# Patient Record
Sex: MALE | NOT HISPANIC OR LATINO | Employment: FULL TIME | URBAN - METROPOLITAN AREA
[De-identification: names, ages, dates, MRNs, and addresses within clinical notes are randomized per-mention and may not be internally consistent; named-entity substitution may affect disease eponyms.]

---

## 2017-10-20 ENCOUNTER — OFFICE VISIT (OUTPATIENT)
Dept: OPTOMETRY | Facility: CLINIC | Age: 37
End: 2017-10-20
Payer: COMMERCIAL

## 2017-10-20 ENCOUNTER — TELEPHONE (OUTPATIENT)
Dept: OPTOMETRY | Facility: CLINIC | Age: 37
End: 2017-10-20

## 2017-10-20 DIAGNOSIS — H52.223 REGULAR ASTIGMATISM OF BOTH EYES: Primary | ICD-10-CM

## 2017-10-20 PROCEDURE — 92015 DETERMINE REFRACTIVE STATE: CPT | Performed by: OPTOMETRIST

## 2017-10-20 PROCEDURE — 92310 CONTACT LENS FITTING OU: CPT | Performed by: OPTOMETRIST

## 2017-10-20 PROCEDURE — 92004 COMPRE OPH EXAM NEW PT 1/>: CPT | Performed by: OPTOMETRIST

## 2017-10-20 ASSESSMENT — REFRACTION_CURRENTRX
OD_SPHERE: +0.25
OS_BRAND: BIOFINITY TORIC
OD_BASECURVE: 8.7
OS_BRAND: J&J ACUVUE OASYS FOR ASTIGMATISM BC 8.6, D 14.5
OD_AXIS: 110
OS_BASECURVE: 8.7
OS_SPHERE: PLANO
OS_CYLINDER: -2.25
OS_AXIS: 080
OD_BRAND: BIOFINITY TORIC
OS_CYLINDER: -2.25
OD_BRAND: J&J ACUVUE OASYS FOR ASTIGMATISM BC 8.6, D 14.5
OD_SPHERE: +0.25
OD_CYLINDER: -2.25
OS_SPHERE: +0.25
OS_AXIS: 080
OD_AXIS: 110
OD_DIAMETER: 14.5
OS_DIAMETER: 14.5
OD_CYLINDER: -2.25

## 2017-10-20 ASSESSMENT — CUP TO DISC RATIO
OD_RATIO: 0.2
OS_RATIO: 0.2

## 2017-10-20 ASSESSMENT — CONF VISUAL FIELD
OS_NORMAL: 1
OD_NORMAL: 1

## 2017-10-20 ASSESSMENT — REFRACTION_WEARINGRX
OD_SPHERE: -2.00
OD_CYLINDER: +2.25
OD_AXIS: 161
OS_AXIS: 015
OS_SPHERE: -2.00
SPECS_TYPE: SVL
OS_CYLINDER: +2.75

## 2017-10-20 ASSESSMENT — VISUAL ACUITY
OS_CC: 20/20
OS_CC: 20/20
OD_CC: 20/20
METHOD: SNELLEN - LINEAR
CORRECTION_TYPE: CONTACTS
OD_CC: 20/20
OS_CC+: -1

## 2017-10-20 ASSESSMENT — EXTERNAL EXAM - RIGHT EYE: OD_EXAM: NORMAL

## 2017-10-20 ASSESSMENT — TONOMETRY
OS_IOP_MMHG: 12
OD_IOP_MMHG: 16
IOP_METHOD: TONOPEN

## 2017-10-20 ASSESSMENT — REFRACTION_MANIFEST
OS_AXIS: 170
OD_CYLINDER: +2.50
OS_SPHERE: -2.25
OD_SPHERE: -2.00
OS_CYLINDER: +2.25
OD_AXIS: 018

## 2017-10-20 ASSESSMENT — SLIT LAMP EXAM - LIDS
COMMENTS: NORMAL
COMMENTS: NORMAL

## 2017-10-20 ASSESSMENT — EXTERNAL EXAM - LEFT EYE: OS_EXAM: NORMAL

## 2017-10-20 NOTE — MR AVS SNAPSHOT
After Visit Summary   10/20/2017    Ron Peoples    MRN: [id number]           Patient Information     Date Of Birth          1980        Visit Information        Provider Department      10/20/2017 8:30 AM Sj Diaz, OD [facility]        Today's Diagnoses     Regular astigmatism of both eyes    -  1      Care Instructions    Eyeglass prescription given.    Contact lens prescription given.  Rx for Biofinity is finalized.  Will try Acuvue Oasys trials- return for cl check if that lens is preferred.    Trial contacts will be sent to patient.    Return in 1 year for a complete eye exam or sooner if needed.    Sj Diaz OD            Follow-ups after your visit        Follow-up notes from your care team     Return in about 1 year (around 10/20/2018) for Annual Visit.      Who to contact     If you have questions or need follow up information about today's clinic visit or your schedule please contact [facility] directly at [phone].  Normal or non-critical lab and imaging results will be communicated to you by Handmarkhart, letter or phone within 4 business days after the clinic has received the results. If you do not hear from us within 7 days, please contact the clinic through Handmarkhart or phone. If you have a critical or abnormal lab result, we will notify you by phone as soon as possible.  Submit refill requests through Digital Harbor or call your pharmacy and they will forward the refill request to us. Please allow 3 business days for your refill to be completed.          Additional Information About Your Visit        Handmarkhart Information     Digital Harbor is an electronic gateway that provides easy, online access to your medical records. With Digital Harbor, you can request a clinic appointment, read your test results, renew a prescription or communicate with your care team.     To sign up for [employer] visit the website at [email or web address]   You will be asked to  enter the access code listed below, as well as some personal information. Please follow the directions to create your username and password.     Your access code is: [email or web address]  Expires: 2018 10:33 AM     Your access code will  in 90 days. If you need help or a new code, please contact your [facility] or call [phone] for assistance.        Care EveryWhere ID     This is your Care EveryWhere ID. This could be used by other organizations to access your [place] medical records  [id number]         Blood Pressure from Last 3 Encounters:   No data found for BP    Weight from Last 3 Encounters:   No data found for Wt              We Performed the Following     CONTACT LENS FITTING,BILAT     EYE EXAM (SIMPLE-NONBILLABLE)     REFRACTION        Primary Care Provider    None Specified       No primary provider on file.        Equal Access to Services     ANA MARÍA ROSALES : Jerel Henriquez, waaxda luqadaha, qaybta kaalmacarmelo negron, chanell prasad . So [facility] [phone].    ATENCIÓN: Si habla español, tiene a grimaldo disposición servicios gratuitos de asistencia lingüística. Llame al [phone].    We comply with applicable federal civil rights laws and [place] laws. We do not discriminate on the basis of race, color, national origin, age, disability, sex, sexual orientation, or gender identity.            Thank you!     Thank you for choosing [facility]  for your care. Our goal is always to provide you with excellent care. Hearing back from our patients is one way we can continue to improve our services. Please take a few minutes to complete the written survey that you may receive in the mail after your visit with us. Thank you!             Your Updated Medication List - Protect others around you: Learn how to safely use, store and throw away your medicines at www.disposemymeds.org.      Notice  As of 10/20/2017 10:33 AM     You have not been prescribed any medications.

## 2017-10-20 NOTE — PROGRESS NOTES
Chief Complaint   Patient presents with     COMPREHENSIVE EYE EXAM     Contact Lens Fitting     aware of $60 fit fee, waiver signed        Previous contact lens wearer? Yes: Biofinity toric  Comfort of contact lenses : good  Satisfied with current lenses: yes mostly but they will rotate and blur sometimes        Last Eye Exam: 02/05/2016  Dilated Previously: Yes    What are you currently using to see?  glasses and contacts, wears contacts every day and glasses just in the evening    Distance Vision Acuity: Satisfied with vision mostly but the left eye is a little blurrier than the right eye    Near Vision Acuity: Satisfied with vision while reading with glasses and contacts    Eye Comfort:  Only dry after about 12 hours of contact lens wear  Do you use eye drops? : Yes: not regularly but uses otc generic drops  Occupation or Hobbies:       Phylicia Loyola  Optometric Assistant, [employer]       Medical, surgical and family histories reviewed and updated 10/20/2017.       OBJECTIVE: See Ophthalmology exam    ASSESSMENT:    ICD-10-CM    1. Regular astigmatism of both eyes H52.223 EYE EXAM (SIMPLE-NONBILLABLE)     CONTACT LENS FITTING,BILAT     REFRACTION      PLAN:     Patient Instructions   Eyeglass prescription given.    Contact lens prescription given.  Rx for Biofinity is finalized.  Will try Acuvue Oasys trials- return for cl check if that lens is preferred.    Trial contacts will be sent to patient.    Return in 1 year for a complete eye exam or sooner if needed.    Sj Diaz, IVY

## 2017-10-20 NOTE — TELEPHONE ENCOUNTER
Brand Base Curve Diameter Sphere Cylinder Axis   Right J&J Acuvue Oasys BC 8.4, D 14.0     +0.25 -2.25 110   Left J&J Acuvue Oasys BC 8.4, D 14.0     [place] -2.25 080     Trials ordered, please mail to patient when in. Ron only needs to schedule a contact lens check appointment if he prefers the AV Oasys over the Biofinity lenses.

## 2017-10-20 NOTE — PATIENT INSTRUCTIONS
Eyeglass prescription given.    Contact lens prescription given.  Rx for Biofinity is finalized.  Will try Acuvue Oasys trials- return for cl check if that lens is preferred.    Trial contacts will be sent to patient.    Return in 1 year for a complete eye exam or sooner if needed.    Sj Diaz, OD

## 2017-11-15 ENCOUNTER — OFFICE VISIT (OUTPATIENT)
Dept: OPTOMETRY | Facility: CLINIC | Age: 37
End: 2017-11-15
Payer: COMMERCIAL

## 2017-11-15 DIAGNOSIS — H52.223 REGULAR ASTIGMATISM OF BOTH EYES: Primary | ICD-10-CM

## 2017-11-15 PROCEDURE — 92499 UNLISTED OPH SVC/PROCEDURE: CPT | Performed by: OPTOMETRIST

## 2017-11-15 PROCEDURE — 99207 ZZC NO BILLABLE SERVICE THIS VISIT: CPT | Performed by: OPTOMETRIST

## 2017-11-15 ASSESSMENT — REFRACTION_CURRENTRX
OD_SPHERE: +0.25
OD_AXIS: 110
OS_AXIS: 080
OD_CYLINDER: -2.25
OS_CYLINDER: -2.25
OD_BRAND: J&J ACUVUE OASYS FOR ASTIGMATISM BC 8.6, D 14.5
OS_SPHERE: PLANO
OS_BRAND: J&J ACUVUE OASYS FOR ASTIGMATISM BC 8.6, D 14.5

## 2017-11-15 NOTE — PROGRESS NOTES
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          Medical, surgical and family histories reviewed and updated 11/15/2017.       OBJECTIVE: See Ophthalmology exam    ASSESSMENT:    ICD-10-CM    1. Regular astigmatism of both eyes H52.223 CONTACT LENS CHECK      PLAN:    Patient Instructions   Contact lens prescription given.  Replace contact lenses every 2 weeks.    Return in 1 year for a complete eye exam or sooner if needed.    Sj Diaz, OD

## 2017-11-15 NOTE — MR AVS SNAPSHOT
After Visit Summary   11/15/2017    Ron Peoples    MRN: [id number]           Patient Information     Date Of Birth          1980        Visit Information        Provider Department      11/15/2017 2:20 PM Sj Diaz OD [facility]        Today's Diagnoses     Regular astigmatism of both eyes    -  1      Care Instructions    Contact lens prescription given.  Replace contact lenses every 2 weeks.    Return in 1 year for a complete eye exam or sooner if needed.    Sj Diaz OD    You can order your contact lenses online at [email or web address].  Click on services at the top of the page, then eye care and you will see the link to order contact lenses.  You can also order contact lenses at [phone].  Be sure to let them know which office you would like to  the lenses, [place] or [place].                  Follow-ups after your visit        Follow-up notes from your care team     Return in about 1 year (around 11/15/2018) for Annual Visit.      Your next 10 appointments already scheduled     Nov 15, 2017  2:20 PM CST   [facility] Visit with Sj Diaz OD   [facility]    [address] [place] [id number] [phone]              Who to contact     If you have questions or need follow up information about today's clinic visit or your schedule please contact [facility] directly at [phone].  Normal or non-critical lab and imaging results will be communicated to you by MyChart, letter or phone within 4 business days after the clinic has received the results. If you do not hear from us within 7 days, please contact the clinic through MyChart or phone. If you have a critical or abnormal lab result, we will notify you by phone as soon as possible.  Submit refill requests through XYZE or call your pharmacy and they will forward the refill request to us. Please allow 3 business  days for your refill to be completed.          Additional Information About Your Visit        VSS Monitoringhart Information     [employer] is an electronic gateway that provides easy, online access to your medical records. With [employer], you can request a clinic appointment, read your test results, renew a prescription or communicate with your care team.     To sign up for [employer] visit the website at [email or web address]   You will be asked to enter the access code listed below, as well as some personal information. Please follow the directions to create your username and password.     Your access code is: [email or web address]  Expires: 2018  9:33 AM     Your access code will  in 90 days. If you need help or a new code, please contact your [facility] or call [phone] for assistance.        Care EveryWhere ID     This is your Care EveryWhere ID. This could be used by other organizations to access your [place] medical records  [id number]         Blood Pressure from Last 3 Encounters:   No data found for BP    Weight from Last 3 Encounters:   No data found for Wt              We Performed the Following     CONTACT LENS CHECK        Primary Care Provider Office Phone # Fax #    [facility] [phone]       [facility] [address], [address]  [place] [address]        Equal Access to Services     ANA MARÍA ROSALES : Hadii aad ku hadasho Soomaali, waaxda luqadaha, qaybta kaalmada adeegyada, waxlauren malik. So [facility] [phone].    ATENCIÓN: Si habla español, tiene a grimaldo disposición servicios gratuitos de asistencia lingüística. Llame al [phone].    We comply with applicable federal civil rights laws and [place] laws. We do not discriminate on the basis of race, color, national origin, age, disability, sex, sexual orientation, or gender identity.            Thank you!     Thank you for choosing [facility] [place]  [facility]  for your care. Our goal is always to provide you with excellent care. Hearing back from our patients is one way we can continue to improve our services. Please take a few minutes to complete the written survey that you may receive in the mail after your visit with us. Thank you!             Your Updated Medication List - Protect others around you: Learn how to safely use, store and throw away your medicines at www.disposemymeds.org.      Notice  As of 11/15/2017  2:18 PM    You have not been prescribed any medications.

## 2017-11-15 NOTE — PATIENT INSTRUCTIONS
Contact lens prescription given.  Replace contact lenses every 2 weeks.    Return in 1 year for a complete eye exam or sooner if needed.    Sj Diaz, IVY    You can order your contact lenses online at [email or web address].  Click on services at the top of the page, then eye care and you will see the link to order contact lenses.  You can also order contact lenses at [phone].  Be sure to let them know which office you would like to  the lenses, [place] or [place].

## 2017-11-16 ENCOUNTER — TELEPHONE (OUTPATIENT)
Dept: OPTOMETRY | Facility: CLINIC | Age: 37
End: 2017-11-16

## 2017-11-16 NOTE — TELEPHONE ENCOUNTER
Call to verify contacts   Record #: [id number]  Rx Brand of contacts: Aucvue Oasys for Astigmatism  QTY: 6 boxes  Right eye power numbers: 8.6 14.5 +0.25-2.25x110  Rx brand of contacts: Aucvue Oasys for Astigmatism  QTY: 6 boxes  Left eye power numbers: 8.6 14.5 pl-2.25x80  Fax number: [phone]     Date the prescription was written: 11/15/17  The expiration date the provider entered for that RX: 11/19/19  The provider that wrote the RX: Sj Diaz OD  Pt Address verified as matched   Fax verification on November 16, 2017  at 8:23 AM  Saniya ORNELAS [facility]

## 2024-08-02 ENCOUNTER — TRANSCRIBE ORDERS (OUTPATIENT)
Dept: OTHER | Age: 44
End: 2024-08-02

## 2024-08-02 DIAGNOSIS — M54.50 LOW BACK PAIN: Primary | ICD-10-CM

## 2024-08-12 ENCOUNTER — THERAPY VISIT (OUTPATIENT)
Dept: PHYSICAL THERAPY | Facility: CLINIC | Age: 44
End: 2024-08-12
Attending: FAMILY MEDICINE
Payer: COMMERCIAL

## 2024-08-12 DIAGNOSIS — M54.50 LOW BACK PAIN: ICD-10-CM

## 2024-08-12 DIAGNOSIS — M54.50 BILATERAL LOW BACK PAIN: Primary | ICD-10-CM

## 2024-08-12 PROCEDURE — 97110 THERAPEUTIC EXERCISES: CPT | Mod: GP | Performed by: PHYSICAL THERAPIST

## 2024-08-12 PROCEDURE — 97161 PT EVAL LOW COMPLEX 20 MIN: CPT | Mod: GP | Performed by: PHYSICAL THERAPIST

## 2024-08-12 NOTE — PROGRESS NOTES
PHYSICAL THERAPY EVALUATION  Type of Visit: Evaluation       Fall Risk Screen:  Fall screen completed by: PT  Have you fallen 2 or more times in the past year?: No  Have you fallen and had an injury in the past year?: No  Is patient a fall risk?: No    Subjective       Presenting condition or subjective complaint: mid back pain  Pt reports insidious onset of mid-low back pain this past winter, with an aggravation a month or two ago (June 2024). The symptoms have persisted, saw primary care MD and referred to PT. Xrays negative.     Date of onset:      Relevant medical history: Sleep disorder like apnea   Dates & types of surgery:      Prior diagnostic imaging/testing results: X-ray     Prior therapy history for the same diagnosis, illness or injury:        Prior Level of Function  Transfers:   Ambulation:   ADL:   IADL:     Living Environment  Social support: With family members   Type of home: House   Stairs to enter the home: Yes       Ramp: No   Stairs inside the home: Yes 12 Is there a railing: Yes     Help at home: None  Equipment owned:       Employment: Yes   Hobbies/Interests: golf    Patient goals for therapy: feel good    Pain assessment: See objective evaluation for additional pain details     Objective   LUMBAR SPINE EVALUATION  PAIN: Pain Level at Rest: 4/10  Pain Level with Use: 8/10  Pain Location: usually a know in lower thoracic/upper lumbar spine R>L, sometime straight across the whole back (today across the whole mid/lower back)  Pain Quality: Aching, Sharp, and sharp if sneezes  Pain Frequency: intermittent  Pain is Worst: activity related  Pain is Exacerbated By: twisting sometimes, transition from slouched on couch to stand  Pain is Relieved By: ibuprofen  Pain Progression: Worsened  INTEGUMENTARY (edema, incisions):   POSTURE:   GAIT:   Weightbearing Status:   Assistive Device(s):   Gait Deviations:   BALANCE/PROPRIOCEPTION:   WEIGHTBEARING ALIGNMENT:   NON-WEIGHTBEARING  ALIGNMENT:    ROM:   (Degrees) Left AROM Left PROM  Right AROM Right PROM   Hip Flexion       Hip Extension       Hip Abduction       Hip Adduction       Hip Internal Rotation       Hip External Rotation       Knee Flexion       Knee Extension       Lumbar Side glide WNL with slight inc R thoracic pain WNL   Lumbar Flexion WNL   Lumbar Extension Nil to mi nloss     Thoracic AROM:  Min loss ext  Nil to min loss R rotation with inc pain  Nil loss L rotation    Pain:   End feel:     Symptomatic response Mechanical response    During testing After testing Effect - increased ROM, decreased ROM, or key functional test No Effect   Pretest symptoms standing:      Rep FIS       Rep EIS         Pretest symptoms lying: none    During testing After testing Effect - increased ROM, decreased ROM, or key functional test No Effect   Rep NICKI       Rep EIL No Effect  One set both hands under shoulders and further forward to target thoracic spine better No Effect Dec pain R thoracic rotation after set with hands forward      If required, pretest symptoms:    During testing After testing Effect - increased ROM, decreased ROM, or key functional test No Effect   Rep SGIS - R       Rep SGIS - L           PELVIC/SI SCREEN:   STRENGTH:  not formally assessed    MYOTOMES:   DTR S:   CORD SIGNS:   DERMATOMES:   NEURAL TENSION:   FLEXIBILITY:   LUMBAR/HIP Special Tests:    PELVIS/SI SPECIAL TESTS:   FUNCTIONAL TESTS:   PALPATION:   SPINAL SEGMENTAL CONCLUSIONS:       Assessment & Plan   CLINICAL IMPRESSIONS  Medical Diagnosis: Low back pain    Treatment Diagnosis: B lower thoracic pain   Impression/Assessment: Patient is a 43 year old male with thoracolumbar complaints.  The following significant findings have been identified: Pain, Decreased ROM/flexibility, Decreased joint mobility, Decreased activity tolerance, and Impaired posture. These impairments interfere with their ability to perform self care tasks, work tasks, and recreational  activities as compared to previous level of function.     Clinical Decision Making (Complexity):  Clinical Presentation: Evolving/Changing  Clinical Presentation Rationale: based on medical and personal factors listed in PT evaluation  Clinical Decision Making (Complexity): Low complexity    PLAN OF CARE  Treatment Interventions:  Interventions: Manual Therapy, Neuromuscular Re-education, Therapeutic Activity, Therapeutic Exercise    Long Term Goals            Frequency of Treatment: 2x/wk  Duration of Treatment: for 6 wks    Recommended Referrals to Other Professionals:   Education Assessment:        Risks and benefits of evaluation/treatment have been explained.   Patient/Family/caregiver agrees with Plan of Care.     Evaluation Time:     PT Eval, Low Complexity Minutes (20235): 22       Signing Clinician: Aleksandr Soto, PT

## 2024-08-19 ENCOUNTER — THERAPY VISIT (OUTPATIENT)
Dept: PHYSICAL THERAPY | Facility: CLINIC | Age: 44
End: 2024-08-19
Attending: FAMILY MEDICINE
Payer: COMMERCIAL

## 2024-08-19 DIAGNOSIS — M54.50 BILATERAL LOW BACK PAIN: Primary | ICD-10-CM

## 2024-08-19 PROCEDURE — 97530 THERAPEUTIC ACTIVITIES: CPT | Mod: GP | Performed by: PHYSICAL THERAPIST

## 2024-08-19 PROCEDURE — 97110 THERAPEUTIC EXERCISES: CPT | Mod: GP | Performed by: PHYSICAL THERAPIST

## 2024-08-27 ENCOUNTER — THERAPY VISIT (OUTPATIENT)
Dept: PHYSICAL THERAPY | Facility: CLINIC | Age: 44
End: 2024-08-27
Payer: COMMERCIAL

## 2024-08-27 DIAGNOSIS — M54.50 BILATERAL LOW BACK PAIN: Primary | ICD-10-CM

## 2024-08-27 PROCEDURE — 97110 THERAPEUTIC EXERCISES: CPT | Mod: GP | Performed by: PHYSICAL THERAPIST

## 2024-08-27 PROCEDURE — 97140 MANUAL THERAPY 1/> REGIONS: CPT | Mod: GP | Performed by: PHYSICAL THERAPIST

## 2024-09-06 ENCOUNTER — THERAPY VISIT (OUTPATIENT)
Dept: PHYSICAL THERAPY | Facility: CLINIC | Age: 44
End: 2024-09-06
Payer: COMMERCIAL

## 2024-09-06 DIAGNOSIS — M54.50 BILATERAL LOW BACK PAIN: Primary | ICD-10-CM

## 2024-09-06 PROCEDURE — 97530 THERAPEUTIC ACTIVITIES: CPT | Mod: GP | Performed by: PHYSICAL THERAPIST

## 2024-09-06 PROCEDURE — 97110 THERAPEUTIC EXERCISES: CPT | Mod: GP | Performed by: PHYSICAL THERAPIST

## 2024-09-06 PROCEDURE — 97140 MANUAL THERAPY 1/> REGIONS: CPT | Mod: GP | Performed by: PHYSICAL THERAPIST

## 2024-09-20 ENCOUNTER — THERAPY VISIT (OUTPATIENT)
Dept: PHYSICAL THERAPY | Facility: CLINIC | Age: 44
End: 2024-09-20
Payer: COMMERCIAL

## 2024-09-20 DIAGNOSIS — M54.50 BILATERAL LOW BACK PAIN: Primary | ICD-10-CM

## 2024-09-20 PROCEDURE — 97530 THERAPEUTIC ACTIVITIES: CPT | Mod: GP | Performed by: PHYSICAL THERAPIST

## 2024-09-20 PROCEDURE — 97110 THERAPEUTIC EXERCISES: CPT | Mod: GP | Performed by: PHYSICAL THERAPIST

## 2024-09-20 PROCEDURE — 97140 MANUAL THERAPY 1/> REGIONS: CPT | Mod: GP | Performed by: PHYSICAL THERAPIST

## 2024-09-21 ENCOUNTER — HEALTH MAINTENANCE LETTER (OUTPATIENT)
Age: 44
End: 2024-09-21

## 2024-10-01 ENCOUNTER — THERAPY VISIT (OUTPATIENT)
Dept: PHYSICAL THERAPY | Facility: CLINIC | Age: 44
End: 2024-10-01
Payer: COMMERCIAL

## 2024-10-01 DIAGNOSIS — M54.50 BILATERAL LOW BACK PAIN: Primary | ICD-10-CM

## 2024-10-01 PROCEDURE — 97530 THERAPEUTIC ACTIVITIES: CPT | Mod: GP | Performed by: PHYSICAL THERAPIST

## 2024-10-01 PROCEDURE — 97140 MANUAL THERAPY 1/> REGIONS: CPT | Mod: GP | Performed by: PHYSICAL THERAPIST

## 2024-10-01 PROCEDURE — 97110 THERAPEUTIC EXERCISES: CPT | Mod: GP | Performed by: PHYSICAL THERAPIST

## 2024-11-09 PROBLEM — M54.50 BILATERAL LOW BACK PAIN: Status: RESOLVED | Noted: 2024-08-12 | Resolved: 2024-11-09
